# Patient Record
Sex: MALE
[De-identification: names, ages, dates, MRNs, and addresses within clinical notes are randomized per-mention and may not be internally consistent; named-entity substitution may affect disease eponyms.]

---

## 2020-10-22 PROBLEM — Z00.00 ENCOUNTER FOR PREVENTIVE HEALTH EXAMINATION: Status: ACTIVE | Noted: 2020-10-22

## 2020-10-26 PROBLEM — Z78.9 NO PERTINENT PAST MEDICAL HISTORY: Status: RESOLVED | Noted: 2020-10-26 | Resolved: 2020-10-26

## 2020-10-29 ENCOUNTER — APPOINTMENT (OUTPATIENT)
Dept: NEUROSURGERY | Facility: CLINIC | Age: 35
End: 2020-10-29
Payer: COMMERCIAL

## 2020-10-29 VITALS
OXYGEN SATURATION: 99 % | RESPIRATION RATE: 18 BRPM | BODY MASS INDEX: 27.2 KG/M2 | HEIGHT: 70 IN | HEART RATE: 66 BPM | TEMPERATURE: 98.4 F | SYSTOLIC BLOOD PRESSURE: 132 MMHG | DIASTOLIC BLOOD PRESSURE: 83 MMHG | WEIGHT: 190 LBS

## 2020-10-29 DIAGNOSIS — Z56.0 UNEMPLOYMENT, UNSPECIFIED: ICD-10-CM

## 2020-10-29 DIAGNOSIS — M50.30 OTHER CERVICAL DISC DEGENERATION, UNSPECIFIED CERVICAL REGION: ICD-10-CM

## 2020-10-29 DIAGNOSIS — Z78.9 OTHER SPECIFIED HEALTH STATUS: ICD-10-CM

## 2020-10-29 DIAGNOSIS — M50.10 CERVICAL DISC DISORDER WITH RADICULOPATHY, UNSPECIFIED CERVICAL REGION: ICD-10-CM

## 2020-10-29 DIAGNOSIS — F17.200 NICOTINE DEPENDENCE, UNSPECIFIED, UNCOMPLICATED: ICD-10-CM

## 2020-10-29 DIAGNOSIS — M54.10 RADICULOPATHY, SITE UNSPECIFIED: ICD-10-CM

## 2020-10-29 PROCEDURE — 99072 ADDL SUPL MATRL&STAF TM PHE: CPT

## 2020-10-29 PROCEDURE — 99243 OFF/OP CNSLTJ NEW/EST LOW 30: CPT

## 2020-10-29 SDOH — ECONOMIC STABILITY - INCOME SECURITY: UNEMPLOYMENT, UNSPECIFIED: Z56.0

## 2020-11-03 NOTE — DATA REVIEWED
[de-identified] : I have reviewed the most recent MRI 9/19/20 which shows C5- disc osteophyte complex causing moderate-sever right foraminal narrowing. C6-7 central disc herniation eccentric to the left

## 2020-11-03 NOTE — ADDENDUM
[FreeTextEntry1] : PATIENT:	Brian Dela Cruz 				\par \par Thursday, October 29, 2020\par \par I saw Steven in the office today.  He is a 31-year-old male status post a snowboarding accident with neck pain at the end of 2019 with subsequent neck and arm pain developing in January of this year, with relief of the neck pain but with persistent left arm symptoms radiating to his shoulder, scapula, and hand.  MRI was performed which shows a C5-6 disc herniation with spinal cord compression, and he now comes for neurosurgical evaluation.\par \par Mr. Dela Cruz has no other major medical history and takes no medications.  He has no drug allergies.  He is currently out of work as a .  He does not smoke.  He does drink occasionally.  He lives alone.  Review of systems is notable for the absence of any contributing factors.  He denies any right arm symptoms and denies any gross weakness.\par \par Neurologically, Steven has give-way weakness of his abductor pollicis longus, but other than that weakness, he has really no other findings.  I was able to review his MRI which shows evidence of a sizable C5-6 disc herniation which is somewhat eccentric to the left with foraminal compromise on the left side.  I do think this is likely his symptom generator.\par \par At this point, I have recommended discectomy with potential disc replacement versus fusion.  We discussed the risks, benefits, and alternatives to both, and I will leave it up to Steven as to whether to go ahead. \par \par \par \par Lonnie Whitehead MD\par \par [facility] #[id number]\par \par

## 2020-11-03 NOTE — PLAN
[FreeTextEntry1] : Pt seen by Dr. Whitehead discussed the disease process with the patient in detail. Conservative management vs. disc replacement vs. cervical fusion were offered. Patient also saw Dr. Dial who had recommended level one disc replacement. Patient still exploring options and will call us if he decides to move forward with us.\par \par

## 2020-11-03 NOTE — HISTORY OF PRESENT ILLNESS
[> 3 months] : more  than 3 months [de-identified] : 35 year old male smoker with no medical history presents with complaints of burning LEFT arm pain radiating to the left hand and right shoulder pain starting in late January.  Left arm pain worse than right shoulder. He took Advil and Naproxen for pain  in the past with no relief. He does report a snowboarding accident a few weeks prior to that. He saw an orthopedist and underwent acupuncture and massage which exacerbated his symptoms. Has not taken any other pain meds, PT.  The pain has improved somewhat since January. Pain is aggravated by repetitive movement/impact. He denies weakness in his arm or hand. He does report some mild subjective imbalance. He denies any bowel or urinary incontinence.

## 2020-11-03 NOTE — PHYSICAL EXAM
[General Appearance - Alert] : alert [General Appearance - In No Acute Distress] : in no acute distress [General Appearance - Well Nourished] : well nourished [Oriented To Time, Place, And Person] : oriented to person, place, and time [Impaired Insight] : insight and judgment were intact [Person] : oriented to person [Place] : oriented to place [Time] : oriented to time [Cranial Nerves Optic (II)] : visual acuity intact bilaterally,  pupils equal round and reactive to light [Cranial Nerves Oculomotor (III)] : extraocular motion intact [Cranial Nerves Facial (VII)] : face symmetrical [Cranial Nerves Vestibulocochlear (VIII)] : hearing was intact bilaterally [Cranial Nerves Glossopharyngeal (IX)] : tongue and palate midline [Cranial Nerves Accessory (XI - Cranial And Spinal)] : head turning and shoulder shrug symmetric [Cranial Nerves Hypoglossal (XII)] : there was no tongue deviation with protrusion [Motor Tone] : muscle tone was normal in all four extremities [Motor Strength] : muscle strength was normal in all four extremities [Neck Appearance] : the appearance of the neck was normal [] : no respiratory distress [Respiration, Rhythm And Depth] : normal respiratory rhythm and effort [Abnormal Walk] : normal gait [FreeTextEntry5] : left hand weaker (5/5) than right hand (5/5) [FreeTextEntry8] : Tandem gait in tact.